# Patient Record
Sex: MALE | Race: WHITE
[De-identification: names, ages, dates, MRNs, and addresses within clinical notes are randomized per-mention and may not be internally consistent; named-entity substitution may affect disease eponyms.]

---

## 2019-02-05 ENCOUNTER — HOSPITAL ENCOUNTER (OUTPATIENT)
Dept: HOSPITAL 84 - D.ER | Age: 49
Setting detail: OBSERVATION
LOS: 1 days | Discharge: HOME | End: 2019-02-06
Attending: FAMILY MEDICINE | Admitting: FAMILY MEDICINE
Payer: COMMERCIAL

## 2019-02-05 VITALS
BODY MASS INDEX: 35.62 KG/M2 | BODY MASS INDEX: 35.62 KG/M2 | HEIGHT: 69 IN | WEIGHT: 240.5 LBS | BODY MASS INDEX: 35.62 KG/M2 | HEIGHT: 69 IN | WEIGHT: 240.5 LBS

## 2019-02-05 VITALS — DIASTOLIC BLOOD PRESSURE: 116 MMHG | SYSTOLIC BLOOD PRESSURE: 170 MMHG

## 2019-02-05 DIAGNOSIS — R04.0: Primary | ICD-10-CM

## 2019-02-05 LAB
ERYTHROCYTE [DISTWIDTH] IN BLOOD BY AUTOMATED COUNT: 12.6 % (ref 11.5–14.5)
HCT VFR BLD CALC: 38 % (ref 42–54)
HGB BLD-MCNC: 13.6 G/DL (ref 13.5–17.5)
LYMPHOCYTES NFR BLD AUTO: 17.9 % (ref 15–50)
MCH RBC QN AUTO: 32.1 PG (ref 26–34)
MCHC RBC AUTO-ENTMCNC: 35.8 G/DL (ref 31–37)
MCV RBC: 89.6 FL (ref 80–100)
NEUTROPHILS NFR BLD AUTO: 74.4 % (ref 40–80)
PLATELET # BLD: 251 10X3/UL (ref 130–400)
PMV BLD AUTO: 9 FL (ref 7.4–10.4)
RBC # BLD AUTO: 4.24 10X6/UL (ref 4.2–6.1)
WBC # BLD AUTO: 9.3 10X3/UL (ref 4.8–10.8)

## 2019-02-05 NOTE — NUR
RECEIVED PT FROM ER VIA W/C. STATES HIS NOSE STARTED BLEEDING AROUND 6PM.
DENIES ANY MEDICAL PROBLEMS AND IS NOT ON ANY HOME MEDS. STATES HE HAS A HX OF
ITP. ALERT AND ORIENTED X4. TALKATIVE WITH STAFF. DENIES PAIN. NASAL TAMPON
NOTED TO RT NARE WHICH IS STILL DRIPPING SMALL AMT OF BLOOD. SALINE LOCK NOTED
TO LT WRIST. RESP EVEN AND NONLABORED. BBS CTA. NO EDEMA NOTED. AMBULATORY.
TELEMETRY SHOWS ST WITH RATE . SR ELEVATED X2. CL IN REACH.

## 2019-02-05 NOTE — NUR
DR. REED NOTIFIED OF PATIENTS BLOOD PRESSURE, HE WILL PUT IN ORDERS FOR
MED/SURG. IV SITED TO LEFT WRIST, 20G X 2 STICKS. PATIENT STATES THAT HE HAS A
RARE BLOOD DISEASE TTP, DR. REED NOTIFIED.

## 2019-02-06 VITALS — DIASTOLIC BLOOD PRESSURE: 97 MMHG | SYSTOLIC BLOOD PRESSURE: 147 MMHG

## 2019-02-06 VITALS — DIASTOLIC BLOOD PRESSURE: 98 MMHG | SYSTOLIC BLOOD PRESSURE: 168 MMHG

## 2019-02-06 VITALS — DIASTOLIC BLOOD PRESSURE: 106 MMHG | SYSTOLIC BLOOD PRESSURE: 158 MMHG

## 2019-02-06 VITALS — DIASTOLIC BLOOD PRESSURE: 105 MMHG | SYSTOLIC BLOOD PRESSURE: 175 MMHG

## 2019-02-06 LAB
ALBUMIN SERPL-MCNC: 3.5 G/DL (ref 3.4–5)
ALP SERPL-CCNC: 74 U/L (ref 46–116)
ALT SERPL-CCNC: 64 U/L (ref 10–68)
ANION GAP SERPL CALC-SCNC: 16.6 MMOL/L (ref 8–16)
APTT BLD: 28.8 SECONDS (ref 22.8–39.4)
BILIRUB SERPL-MCNC: 0.44 MG/DL (ref 0.2–1.3)
BUN SERPL-MCNC: 25 MG/DL (ref 7–18)
CALCIUM SERPL-MCNC: 8.3 MG/DL (ref 8.5–10.1)
CHLORIDE SERPL-SCNC: 104 MMOL/L (ref 98–107)
CO2 SERPL-SCNC: 22.2 MMOL/L (ref 21–32)
CREAT SERPL-MCNC: 0.9 MG/DL (ref 0.6–1.3)
GLOBULIN SER-MCNC: 3.3 G/L
GLUCOSE SERPL-MCNC: 112 MG/DL (ref 74–106)
INR PPP: 0.98 (ref 0.85–1.17)
OSMOLALITY SERPL CALC.SUM OF ELEC: 282 MOSM/KG (ref 275–300)
POTASSIUM SERPL-SCNC: 3.8 MMOL/L (ref 3.5–5.1)
PROT SERPL-MCNC: 6.8 G/DL (ref 6.4–8.2)
PROTHROMBIN TIME: 12.5 SECONDS (ref 11.6–15)
SODIUM SERPL-SCNC: 139 MMOL/L (ref 136–145)

## 2019-02-06 NOTE — NUR
CALLED AND SPOKE WITH DANNIELLE IN THE PHARMACY REQUESTING THE IV BLOOD PRESSURE
MEDICATION, REQUESTED 2 DOSES

## 2023-06-21 ENCOUNTER — ANESTHESIA EVENT (OUTPATIENT)
Facility: HOSPITAL | Age: 53
End: 2023-06-21
Payer: COMMERCIAL

## 2023-06-21 RX ORDER — ROSUVASTATIN CALCIUM 10 MG/1
10 TABLET, COATED ORAL DAILY
COMMUNITY

## 2023-06-21 RX ORDER — VALSARTAN 160 MG/1
160 TABLET ORAL DAILY
COMMUNITY

## 2023-06-21 RX ORDER — METOPROLOL SUCCINATE 25 MG/1
25 TABLET, EXTENDED RELEASE ORAL DAILY
COMMUNITY

## 2023-06-21 NOTE — H&P
Colonoscopy H&P    History:  Preop office visit H&P reviewed, no changes. 47 y.o. male here for colonoscopy for screening and GI bleeding. Past Medical History:   Diagnosis Date    Hypertension     PONV (postoperative nausea and vomiting)     x1 on first surgery    Sleep apnea     cpap        Past Surgical History:   Procedure Laterality Date    ANTERIOR CRUCIATE LIGAMENT REPAIR Right 2018    INGUINAL HERNIA REPAIR  [id number]    UMBILICAL HERNIA REPAIR  1989       Current Outpatient Medications   Medication Instructions    metoprolol succinate (TOPROL XL) 25 mg, Oral, DAILY    rosuvastatin (CRESTOR) 10 mg, Oral, DAILY    valsartan (DIOVAN) 160 mg, Oral, DAILY        ROS:  Negative except as noted above    Exam:  No data found. Constitutional:       Appearance: Normal appearance. HENT:      Head: Normocephalic and atraumatic. Eyes:      Extraocular Movements: Extraocular movements intact. Pupils: Pupils are equal, round, and reactive to light. Cardiovascular:      Rate and Rhythm: Normal rate and regular rhythm. Pulmonary:      Effort: Pulmonary effort is normal.   Abdominal:      General: Abdomen is flat. Palpations: Abdomen is soft. Musculoskeletal:         General: Normal range of motion. Cervical back: Normal range of motion. Skin:     General: Skin is warm and dry. Neurological:      General: No focal deficit present. Mental Status: She is alert. Psychiatric:         Mood and Affect: Mood normal.         Behavior: Behavior normal.     Assessment:  47 y.o. male here for colonoscopy for screening and GI bleeding. Plan:  Proceed with colonoscopy. Risks and benefits of colonoscopy discussed. Risks discussed include bleeding, perforation, missed adenoma, adverse medication reaction, etc. They understand and wish to proceed. family

## 2023-06-21 NOTE — ANESTHESIA PRE PROCEDURE
Department of Anesthesiology  Preprocedure Note       Name:  Urbano Broderick   Age:  47 y.o.  :  1970                                          MRN:  [id number]         Date:  2023      Surgeon: Ysabel Alarcon):  Omi Hernandez MD    Procedure: Procedure(s):  COLONOSCOPY    Medications prior to admission:   Prior to Admission medications    Medication Sig Start Date End Date Taking? Authorizing Provider   valsartan (DIOVAN) 160 MG tablet Take 1 tablet by mouth daily   Yes Historical Provider, MD   rosuvastatin (CRESTOR) 10 MG tablet Take 1 tablet by mouth daily   Yes Historical Provider, MD   metoprolol succinate (TOPROL XL) 25 MG extended release tablet Take 1 tablet by mouth daily   Yes Historical Provider, MD       Current medications:    No current facility-administered medications for this encounter. Current Outpatient Medications   Medication Sig Dispense Refill    valsartan (DIOVAN) 160 MG tablet Take 1 tablet by mouth daily      rosuvastatin (CRESTOR) 10 MG tablet Take 1 tablet by mouth daily      metoprolol succinate (TOPROL XL) 25 MG extended release tablet Take 1 tablet by mouth daily         Allergies: Allergies   Allergen Reactions    Pcn [Penicillins] Hives       Problem List:  There is no problem list on file for this patient.       Past Medical History:        Diagnosis Date    Hypertension     PONV (postoperative nausea and vomiting)     x1 on first surgery    Sleep apnea     cpap       Past Surgical History:        Procedure Laterality Date    ANTERIOR CRUCIATE LIGAMENT REPAIR Right 2018    INGUINAL HERNIA REPAIR  [id number]    UMBILICAL HERNIA REPAIR         Social History:    Social History     Tobacco Use    Smoking status: Former     Packs/day: 0.50     Years: 15.00     Pack years: 7.50     Types: Cigarettes     Quit date: 2015     Years since quittin.9    Smokeless tobacco: Never   Substance Use Topics    Alcohol use: Never Statement Selected

## 2023-06-22 ENCOUNTER — ANESTHESIA (OUTPATIENT)
Facility: HOSPITAL | Age: 53
End: 2023-06-22
Payer: COMMERCIAL

## 2023-06-22 ENCOUNTER — HOSPITAL ENCOUNTER (OUTPATIENT)
Facility: HOSPITAL | Age: 53
Setting detail: OUTPATIENT SURGERY
Discharge: HOME | End: 2023-06-22
Attending: STUDENT IN AN ORGANIZED HEALTH CARE EDUCATION/TRAINING PROGRAM | Admitting: STUDENT IN AN ORGANIZED HEALTH CARE EDUCATION/TRAINING PROGRAM
Payer: COMMERCIAL

## 2023-06-22 VITALS
HEART RATE: 47 BPM | BODY MASS INDEX: 36.73 KG/M2 | RESPIRATION RATE: 13 BRPM | WEIGHT: 248 LBS | SYSTOLIC BLOOD PRESSURE: 128 MMHG | HEIGHT: 69 IN | TEMPERATURE: 98 F | OXYGEN SATURATION: 96 % | DIASTOLIC BLOOD PRESSURE: 75 MMHG

## 2023-06-22 PROCEDURE — 2580000003 HC RX 258: Performed by: STUDENT IN AN ORGANIZED HEALTH CARE EDUCATION/TRAINING PROGRAM

## 2023-06-22 PROCEDURE — 3700000001 HC ADD 15 MINUTES (ANESTHESIA): Performed by: STUDENT IN AN ORGANIZED HEALTH CARE EDUCATION/TRAINING PROGRAM

## 2023-06-22 PROCEDURE — 7100000010 HC PHASE II RECOVERY - FIRST 15 MIN: Performed by: STUDENT IN AN ORGANIZED HEALTH CARE EDUCATION/TRAINING PROGRAM

## 2023-06-22 PROCEDURE — 3600007502: Performed by: STUDENT IN AN ORGANIZED HEALTH CARE EDUCATION/TRAINING PROGRAM

## 2023-06-22 PROCEDURE — 7100000011 HC PHASE II RECOVERY - ADDTL 15 MIN: Performed by: STUDENT IN AN ORGANIZED HEALTH CARE EDUCATION/TRAINING PROGRAM

## 2023-06-22 PROCEDURE — 2500000003 HC RX 250 WO HCPCS: Performed by: NURSE ANESTHETIST, CERTIFIED REGISTERED

## 2023-06-22 PROCEDURE — 3600007512: Performed by: STUDENT IN AN ORGANIZED HEALTH CARE EDUCATION/TRAINING PROGRAM

## 2023-06-22 PROCEDURE — 3700000000 HC ANESTHESIA ATTENDED CARE: Performed by: STUDENT IN AN ORGANIZED HEALTH CARE EDUCATION/TRAINING PROGRAM

## 2023-06-22 PROCEDURE — 2709999900 HC NON-CHARGEABLE SUPPLY: Performed by: STUDENT IN AN ORGANIZED HEALTH CARE EDUCATION/TRAINING PROGRAM

## 2023-06-22 PROCEDURE — 6360000002 HC RX W HCPCS: Performed by: NURSE ANESTHETIST, CERTIFIED REGISTERED

## 2023-06-22 RX ORDER — SODIUM CHLORIDE 0.9 % (FLUSH) 0.9 %
5-40 SYRINGE (ML) INJECTION EVERY 12 HOURS SCHEDULED
Status: DISCONTINUED | OUTPATIENT
Start: 2023-06-22 | End: 2023-06-22 | Stop reason: HOSPADM

## 2023-06-22 RX ORDER — SODIUM CHLORIDE 9 MG/ML
25 INJECTION, SOLUTION INTRAVENOUS PRN
Status: DISCONTINUED | OUTPATIENT
Start: 2023-06-22 | End: 2023-06-22 | Stop reason: HOSPADM

## 2023-06-22 RX ORDER — GLYCOPYRROLATE 0.2 MG/ML
INJECTION INTRAMUSCULAR; INTRAVENOUS PRN
Status: DISCONTINUED | OUTPATIENT
Start: 2023-06-22 | End: 2023-06-22 | Stop reason: SDUPTHER

## 2023-06-22 RX ORDER — LIDOCAINE HYDROCHLORIDE 20 MG/ML
INJECTION, SOLUTION EPIDURAL; INFILTRATION; INTRACAUDAL; PERINEURAL PRN
Status: DISCONTINUED | OUTPATIENT
Start: 2023-06-22 | End: 2023-06-22 | Stop reason: SDUPTHER

## 2023-06-22 RX ORDER — SODIUM CHLORIDE 0.9 % (FLUSH) 0.9 %
5-40 SYRINGE (ML) INJECTION PRN
Status: DISCONTINUED | OUTPATIENT
Start: 2023-06-22 | End: 2023-06-22 | Stop reason: HOSPADM

## 2023-06-22 RX ADMIN — PROPOFOL 50 MG: 10 INJECTION, EMULSION INTRAVENOUS at 08:26

## 2023-06-22 RX ADMIN — PROPOFOL 50 MG: 10 INJECTION, EMULSION INTRAVENOUS at 08:40

## 2023-06-22 RX ADMIN — LIDOCAINE HYDROCHLORIDE 20 MG: 20 INJECTION, SOLUTION EPIDURAL; INFILTRATION; INTRACAUDAL; PERINEURAL at 08:26

## 2023-06-22 RX ADMIN — SODIUM CHLORIDE 25 ML: 9 INJECTION, SOLUTION INTRAVENOUS at 07:58

## 2023-06-22 RX ADMIN — PROPOFOL 50 MG: 10 INJECTION, EMULSION INTRAVENOUS at 08:37

## 2023-06-22 RX ADMIN — PROPOFOL 50 MG: 10 INJECTION, EMULSION INTRAVENOUS at 08:27

## 2023-06-22 RX ADMIN — GLYCOPYRROLATE 0.1 MG: 0.2 INJECTION, SOLUTION INTRAMUSCULAR; INTRAVENOUS at 08:28

## 2023-06-22 RX ADMIN — PROPOFOL 50 MG: 10 INJECTION, EMULSION INTRAVENOUS at 08:33

## 2023-06-22 ASSESSMENT — PAIN - FUNCTIONAL ASSESSMENT: PAIN_FUNCTIONAL_ASSESSMENT: 0-10

## 2023-06-22 NOTE — ANESTHESIA POSTPROCEDURE EVALUATION
Department of Anesthesiology  Postprocedure Note    Patient: Monroe Kaur  MRN: [id number]  YOB: 1970  Date of evaluation: 6/22/2023      Procedure Summary     Date: 06/22/23 Room / Location: [facility] ENDO 01 / [facility] END[facility]COPY    Anesthesia Start: 4845 Anesthesia Stop: Norris Headings    Procedure: COLONOSCOPY (Lower GI Region) Diagnosis:       Special screening for malignant neoplasms, colon      (Special screening for malignant neoplasms, colon [Z12.11])    Surgeons: Devon Blancas MD Responsible Provider: Miranda Calvert MD    Anesthesia Type: TIVA ASA Status: 2          Anesthesia Type: TIVA    Adriana Phase I: Adriana Score: 10    Adriana Phase II: Adriana Score: 10      Anesthesia Post Evaluation    Patient location during evaluation: PACU  Patient participation: complete - patient participated  Level of consciousness: awake and alert  Airway patency: patent  Nausea & Vomiting: no nausea and no vomiting  Complications: no  Cardiovascular status: hemodynamically stable  Respiratory status: acceptable  Hydration status: stable

## 2023-06-22 NOTE — PROGRESS NOTES
ARRIVAL INFORMATION:  Verified patient name and date of birth, scheduled procedure, and informed consent. : Tran Lucero (wife) contact number:  Physician and staff can share information with the . Belongings with patient include:  Clothing,Glasses        GI FOCUSED ASSESSMENT:  Neuro: Awake, alert, oriented x4  Respiratory: even and unlabored   GI: soft and non-distended  EKG Rhythm: sinus bradycardia    Education:Reviewed general discharge instructions and  information.

## 2023-06-22 NOTE — DISCHARGE INSTRUCTIONS
Tanya Bonilla  [id number]  1970    COLON DISCHARGE INSTRUCTIONS  Discomfort:  Redness at IV site- apply warm compress to area; if redness or soreness persist- contact your physician  There may be a slight amount of blood passed from the rectum  Gaseous discomfort- walking, belching will help relieve any discomfort  You may not operate a vehicle for 12 hours  You may not engage in an occupation involving machinery or appliances for rest of today  You may not drink alcoholic beverages for at least 12 hours  Avoid making any critical decisions for at least 24 hour  DIET:   High fiber diet. - however -  remember your colon is empty and a heavy meal will produce gas. Avoid these foods:  vegetables, fried / greasy foods, carbonated drinks for today     ACTIVITY:  You may resume your normal daily activities it is recommended that you spend the remainder of the day resting -  avoid any strenuous activity. CALL M.D. ANY SIGN OF:   Increasing pain, nausea, vomiting  Abdominal distension (swelling)  New increased bleeding (oral or rectal)  Fever (chills)  Pain in chest area  Bloody discharge from nose or mouth  Shortness of breath     Follow-up Instructions:   Call Nakita Mattson MD if any questions or problems. Telephone # [phone]  Biopsy results will be available in  7 to10 days  Should have a repeat colonoscopy in 10 years. COLONOSCOPY FINDINGS:  Your colonoscopy showed: diverticulosis.     Patient Education on Sedation / Analgesia Administered for Procedure      For 24 hours after general anesthesia or intravenous analgesia / sedation:  Have someone responsible help you with your care  Limit your activities  Do not drive and operate hazardous machinery  Do not make important personal, legal or business decisions  Do not drink alcoholic beverages  If you have not urinated within 8 hours after discharge, please contact your physician  Resume your medications unless otherwise instructed    For

## 2023-06-22 NOTE — OP NOTE
Operative Note      Patient: Tom Mcknight  YOB: 1970  MRN: [id number]    Date of Procedure: 6/22/2023    Pre-Op Diagnosis Codes: * Special screening for malignant neoplasms, colon [Z12.11]    Post-Op Diagnosis:  diverticulosis       Procedure(s):  COLONOSCOPY    Surgeon(s):  Jarvis Kinney MD    Assistant:   * No surgical staff found *    Anesthesia: Monitor Anesthesia Care    Estimated Blood Loss (mL): none    Complications: None    Specimens:   * No specimens in log *    Implants:  * No implants in log *      Drains: * No LDAs found *    Findings: pan-diverticulosis        Detailed Description of Procedure:   Colonoscopy Procedure Note     Procedure: Colonoscopy     Indications: screening     Procedure Details      Informed consent was obtained for the procedure. Risks of perforation and hemorrhage were discussed. The patient was placed in the left lateral decubitus position, the anal region was examined, a rectal performed, then the colonoscope was inserted and advanced without difficulty. The prep was good. The instrument was withdrawn and retroflexed with good views throughout. Findings:  ANUS: Anal exam reveals no masses or hemorrhoids, sphincter tone is normal. External hemorrhoids   RECTUM: internal hemorrhoids, otherwise normal  SIGMOID COLON: diverticulosis  DESCENDING COLON: diverticulosis  TRANSVERSE COLON: diverticulosis  ASCENDING COLON: diverticulosis  CECUM: The appendiceal orifice appears normal. The ileocecal valve appears normal.   TERMINAL ILEUM: normal     Specimens: none           Complications:  None; patient tolerated the procedure well. Disposition: PACU - hemodynamically stable. Condition: stable     Impression:    Diverticulosis     Recommendations:  Repeat colonoscopy in 10 years.          Electronically signed by Jarvis Kinney MD on 6/22/2023 at 8:43 AM

## (undated) DEVICE — ENDOSCOPIC KIT COMPLIANCE ENDOKIT

## (undated) DEVICE — IV START KIT: Brand: MEDLINE